# Patient Record
(demographics unavailable — no encounter records)

---

## 2018-06-15 NOTE — EMERGENCY DEPARTMENT REPORT
ED General Adult HPI





- General


Chief complaint: Alcohol


Stated complaint: ETOH


Time Seen by Provider: 06/15/18 19:53


Source: EMS


Mode of arrival: Stretcher


Limitations: No Limitations





- History of Present Illness


Initial comments: 





Patient presents to the emergency department for evaluation.  Patient states 

that she has been drinking and comes to the ED for further evaluation.  Patient 

has no other complaints.  Patient denies chest pain, shortness of breath, 

abdominal pain, headache


Radiation: non-radiation


Severity scale (0 -10): 0


Improves with: none


Worsens with: none


Associated Symptoms: denies other symptoms


Treatments Prior to Arrival: none





ED Review of Systems


ROS: 


Stated complaint: ETOH


Other details as noted in HPI





Comment: All other systems reviewed and negative


Constitutional: denies: chills, fever


Eyes: denies: eye pain, eye discharge, vision change


ENT: denies: ear pain, throat pain


Respiratory: denies: cough, shortness of breath, wheezing


Cardiovascular: denies: chest pain, palpitations


Endocrine: no symptoms reported


Gastrointestinal: denies: abdominal pain, nausea, diarrhea


Genitourinary: denies: urgency, dysuria, discharge


Musculoskeletal: denies: back pain, joint swelling, arthralgia


Skin: denies: rash, lesions


Neurological: denies: headache, weakness, paresthesias


Psychiatric: denies: anxiety, depression


Hematological/Lymphatic: denies: easy bleeding, easy bruising





ED Past Medical Hx





- Social History


Smoking Status: Current Every Day Smoker


Substance Use Type: Alcohol





ED Physical Exam





- General


Limitations: No Limitations, Other (patient appears inebriated with an odor of 

EtOH)


General appearance: alert, in no apparent distress





- Head


Head exam: Present: atraumatic, normocephalic





- Eye


Eye exam: Present: normal appearance





- ENT


ENT exam: Present: mucous membranes moist





- Neck


Neck exam: Present: normal inspection





- Respiratory


Respiratory exam: Present: normal lung sounds bilaterally.  Absent: respiratory 

distress





- Cardiovascular


Cardiovascular Exam: Present: regular rate, normal rhythm.  Absent: systolic 

murmur, diastolic murmur, rubs, gallop





- GI/Abdominal


GI/Abdominal exam: Present: soft, normal bowel sounds.  Absent: distended, 

tenderness





- Extremities Exam


Extremities exam: Present: normal inspection





- Back Exam


Back exam: Present: normal inspection





- Neurological Exam


Neurological exam: Present: alert, oriented X3, CN II-XII intact.  Absent: 

motor sensory deficit





- Psychiatric


Psychiatric exam: Present: normal affect, normal mood





- Skin


Skin exam: Present: warm, dry, intact, normal color.  Absent: rash





ED Course


 Vital Signs











  06/15/18 06/15/18





  19:50 20:00


 


Pulse Rate  79


 


Respiratory 19 20





Rate  


 


Blood Pressure  121/70


 


O2 Sat by Pulse 88 92





Oximetry  














ED Medical Decision Making





- Lab Data


Result diagrams: 


 06/15/18 20:23





 06/15/18 20:23





- Medical Decision Making





Discussed results with the patient


Critical care attestation.: 


If time is entered above; I have spent that time in minutes in the direct care 

of this critically ill patient, excluding procedure time.








ED Disposition


Clinical Impression: 


 Alcohol abuse





Disposition: DC-01 TO HOME OR SELFCARE


Is pt being admited?: No


Does the pt Need Aspirin: No


Condition: Stable


Instructions:  Alcohol Intoxication (ED), Abuse of Alcohol (ED)


Additional Instructions: 


Return if worse


Time of Disposition: 02:15

## 2018-06-20 NOTE — EMERGENCY DEPARTMENT REPORT
ED Shortness of Breath HPI





- General


Chief Complaint: Dyspnea/Respdistress


Stated Complaint: DIFFICULTY  BREATHING


Time Seen by Provider: 06/20/18 17:30


Source: EMS


Mode of arrival: Stretcher


Limitations: No Limitations





- History of Present Illness


Initial Comments: 





Patient states she is homeless and she's been smoking and drinking alcohol all 

day.  She came to the emergency room complaining of shortness of breath after 

drinking alcohol and smoking cigarettes today.  Patient looks unkempt.


MD Complaint: shortness of breath


-: Gradual


Severity: mild


Pain Scale: 3


Improves With: nothing


Worsens With: nothing


Treatments Prior to Arrival: none





- Related Data


 Home Medications











 Medication  Instructions  Recorded  Confirmed  Last Taken


 


Unobtainable  06/20/18 06/20/18 Unknown











 Allergies











Allergy/AdvReac Type Severity Reaction Status Date / Time


 


Penicillins Allergy Unknown Unknown Verified 06/20/18 17:21














ED Review of Systems


ROS: 


Stated complaint: DIFFICULTY  BREATHING


Other details as noted in HPI





Comment: All other systems reviewed and negative


Constitutional: denies: chills, fever


Eyes: denies: eye pain


ENT: denies: ear pain


Respiratory: shortness of breath


Cardiovascular: denies: chest pain, palpitations, dyspnea on exertion


Endocrine: no symptoms reported


Gastrointestinal: denies: abdominal pain, nausea, vomiting, diarrhea


Genitourinary: denies: urgency, dysuria, frequency


Musculoskeletal: denies: back pain, joint swelling


Skin: denies: rash, change in color


Neurological: denies: headache, weakness


Psychiatric: denies: anxiety, depression


Hematological/Lymphatic: denies: easy bleeding, easy bruising





ED Past Medical Hx





- Past Medical History


Previous Medical History?: Yes


Hx Hypertension: Yes


Hx Seizures: Yes


Hx COPD: Yes





- Surgical History


Past Surgical History?: Yes


Additional Surgical History: Partial Hysterectomy





- Social History


Smoking Status: Current Every Day Smoker


Substance Use Type: Alcohol





- Medications


Home Medications: 


 Home Medications











 Medication  Instructions  Recorded  Confirmed  Last Taken  Type


 


Unobtainable  06/20/18 06/20/18 Unknown History














ED Physical Exam





- General


Limitations: No Limitations


General appearance: alert, in no apparent distress, other (Unkempt)





- Head


Head exam: Present: atraumatic, normocephalic, normal inspection





- Eye


Eye exam: Present: normal appearance, PERRL, EOMI


Pupils: Present: normal accommodation





- ENT


ENT exam: Present: normal exam, normal orophraynx, mucous membranes moist





- Neck


Neck exam: Present: normal inspection, full ROM.  Absent: tenderness





- Respiratory


Respiratory exam: Present: normal lung sounds bilaterally.  Absent: respiratory 

distress, wheezes, rales, rhonchi





- Cardiovascular


Cardiovascular Exam: Present: regular rate, normal rhythm, normal heart sounds





- GI/Abdominal


GI/Abdominal exam: Present: soft, normal bowel sounds.  Absent: distended, 

tenderness, guarding, rebound





- Extremities Exam


Extremities exam: Present: normal inspection, full ROM, normal capillary refill





- Back Exam


Back exam: Present: normal inspection, full ROM





- Neurological Exam


Neurological exam: Present: alert, oriented X3, CN II-XII intact





- Psychiatric


Psychiatric exam: Present: normal affect, normal mood





- Skin


Skin exam: Present: warm, dry, intact, normal color





ED Course


 Vital Signs











  06/20/18 06/20/18 06/21/18





  17:13 19:28 10:41


 


Temperature 97.5 F L  97.8 F


 


Pulse Rate 112 H  92 H


 


Pulse Rate [  92 H 





Anterior   





Bilateral   





Throughout]   


 


Respiratory 18  18





Rate   


 


Respiratory  18 





Rate [Anterior   





Bilateral   





Throughout]   


 


Blood Pressure 140/80  178/90





[Left]   


 


O2 Sat by Pulse 100  95





Oximetry   














- Reevaluation(s)


Reevaluation #1: 





06/20/18 20:02


Patient care was transferred to Dr Siddiqi at shift change pending alcohol level, 

CTA of the chest with IV contrast to evaluate for pulmonary embolism.


Dr. Siddiqi to re-evaluate and disposition patient after her Lab and CT scan has 

resulted.





ED Medical Decision Making





- Lab Data


Result diagrams: 


 06/20/18 18:54





 06/20/18 18:54





- EKG Data


-: EKG Interpreted by Me


EKG shows normal: sinus rhythm


Rate: normal (99)





- EKG Data


When compared to previous EKG there are: previous EKG unavailable


Interpretation: no acute changes





- Radiology Data


Radiology results: report reviewed, image reviewed





- Medical Decision Making





Shortness of breath.


COPD.


Critical care attestation.: 


If time is entered above; I have spent that time in minutes in the direct care 

of this critically ill patient, excluding procedure time.








ED Disposition


Clinical Impression: 


 Alcohol abuse, Elevated d-dimer





Dyspnea


Qualifiers:


 Dyspnea type: unspecified Qualified Code(s): R06.00 - Dyspnea, unspecified





Disposition: DC-09 OP ADMIT IP TO THIS HOSP


Is pt being admited?: Yes


Does the pt Need Aspirin: No


Condition: Stable


Referrals: 


PRIMARY CARE,MD [Primary Care Provider] - 3-5 Days

## 2018-06-20 NOTE — CAT SCAN REPORT
FINAL REPORT



PROCEDURE:  CT ANGIO CHEST



TECHNIQUE:  Computerized tomographic angiography of the chest was

performed after the IV injection of iodinated nonionic contrast

including image processing. The image data was postprocessed

using 2-dimensional multiplanar reformatted (MPR) and

3-dimensional (MIP and/or volume rendered) techniques. 



HISTORY:  Elevated D-Dimer 



COMPARISON:  No prior studies are available for comparison.



FINDINGS:  

Bilateral pulmonary arteries and their branches demonstrate

normal opacification without filling defects. Aorta is of normal

caliber without evidence of dissection or aneurysm formation.

Cardiac size is within normal limits. There is mild degree

sub-carinal lymphadenopathy. Nonenlarged right hilar lymph nodes

are identified. Thyroid demonstrates normal size and density.

Visualized upper abdominal structures are within normal limits.

There is diffuse thickening of interstitial septa. There are no

confluent infiltrates or mass lesions. 



IMPRESSION:  

No evidence of pulmonary embolism



No acute pulmonary process



Diffuse interstitial septal thickening most likely represents

interstitial fibrosis. 



Mild degree sub-carinal lymphadenopathy.

## 2018-06-20 NOTE — XRAY REPORT
FINAL REPORT



EXAM:  XR CHEST 1V AP



HISTORY:  Shortness of breath 



TECHNIQUE:  AP portable view of the chest



PRIORS:  None.



FINDINGS:  

Lines, tubes, and devices:  N/A



Lungs and pleura: Trachea is normal in position. Lungs are clear

of infiltrate, pleural effusion, vascular congestion, or

pneumothorax. 



Cardiomediastinal silhouette: Cardiac and mediastinal silhouettes

are unremarkable. Calcification of the aortic arch is noted. 



Other: Bony structures are intact.



IMPRESSION:  

No acute cardiopulmonary process seen.

## 2018-06-21 NOTE — CONSULTATION
History of Present Illness





- Reason for Consult


Consult date: 06/21/18


Reason for consult: Mental Health Evaluation


Requesting physician: WADE CRUZ





- Chief Complaint


Chief complaint: 


"I want detox"





- History of Present Psychiatric Illness


63 y.o. white female presenting to the ER for shortness of breath and ETOH. 

Today the patient is calm and cooperative during the assessment. She stated  

drinking (etoh) more frequent since the death of both of her parents in 2008. 

She stated that she has been to rehab in the past, but would always relapse. 

She stated having a hx of depression and take Celexa. She stated that her main 

focus is to stop drinking (etoh) and not being homeless. She denies SI/HI's and 

AVH's. She denies any manic episodes and erratic sleep. She denies recreational 

drug use (etoh).  








Medications and Allergies


 Allergies











Allergy/AdvReac Type Severity Reaction Status Date / Time


 


Penicillins Allergy Unknown Unknown Verified 06/20/18 17:21











 Home Medications











 Medication  Instructions  Recorded  Confirmed  Last Taken  Type


 


Unobtainable  06/20/18 06/20/18 Unknown History











Active Meds: 


Active Medications





Enoxaparin Sodium (Lovenox)  40 mg SUB-Q QDAY ADORE


Famotidine (Pepcid)  20 mg PO BID ADORE


Folic Acid 1 mg/ Multivitamins /Minerals 10 ml/ Thiamine HCl 100 mg/ Sodium 

Chloride  1,000 mls @ 125 mls/hr IV .BY DURATION ADORE


Sodium Chloride (Nacl 0.9% 1000 Ml)  1,000 mls @ 125 mls/hr IV .BY DURATION ADORE


Lorazepam (Ativan)  2 mg IV Q1HR PRN


   PRN Reason: CIWA-Ar 8-15


   Last Admin: 06/21/18 07:55 Dose:  2 mg


Lorazepam (Ativan)  4 mg IV Q1HR PRN


   PRN Reason: CIWA-Ar 16-25


Lorazepam (Ativan)  4 mg IV Q15MIN PRN


   PRN Reason: CIWA-Ar >25











Past psychiatric history





- Past Medical History


Past Medical History: COPD, hypertension, seizures


Past Surgical History: No surgical history





- past Psychiatric treatment and history


Psych: Depression


psychiatric treatment history: 


Hx of depression. Denies a fam psy hx. 








- Social History


Social history: other (Homeless)





Mental Status Exam





- Vital signs


 Last Vital Signs











Temp  97.8 F   06/21/18 10:41


 


Pulse  92 H  06/21/18 10:41


 


Resp  18   06/21/18 11:53


 


BP  178/90   06/21/18 10:41


 


Pulse Ox  95   06/21/18 10:41














- Exam


Narrative exam: 


MSE:





 Appearance: calm, cooperative   


 Behavior: regular eye contact


 Speech: regular rate and tone


 Mood: withdrawn, sad


 Affect: congruent to mood   


 Thought Process: circumstantial           


 Thought Content: denies SI/HI's and AVH's


 Motor Activity: lying in bed


 Cognition: A/O x 3


 Insight: appropriate 


 Judgment: appropriate  











Results


Result Diagrams: 


 06/20/18 18:54





 06/20/18 18:54


 Abnormal lab results











  06/20/18 06/20/18 06/20/18 Range/Units





  18:54 18:54 18:54 


 


RBC   3.16 L   (3.65-5.03)  M/mm3


 


MCV   99 H   (79-97)  fl


 


MCH   33 H   (28-32)  pg


 


RDW   18.4 H   (13.2-15.2)  %


 


Mono % (Auto)   8.3 H   (0.0-7.3)  %


 


Eos % (Auto)   7.6 H   (0.0-4.3)  %


 


Eos #   0.5 H   (0.0-0.4)  K/mm3


 


D-Dimer     (0-234)  ng/mlDDU


 


BUN  6 L    (7-17)  mg/dL


 


Creatinine  0.6 L    (0.7-1.2)  mg/dL


 


Glucose  113 H    ()  mg/dL


 


AST    101 H  (5-40)  units/L


 


ALT    70 H  (7-56)  units/L


 


Urine WBC (Auto)     (0.0-6.0)  /HPF


 


Salicylates     (2.8-20.0)  mg/dL


 


Acetaminophen     (10.0-30.0)  ug/mL


 


Plasma/Serum Alcohol     (0-0.07)  %














  06/20/18 06/20/18 06/20/18 Range/Units





  18:54 18:54 18:54 


 


RBC     (3.65-5.03)  M/mm3


 


MCV     (79-97)  fl


 


MCH     (28-32)  pg


 


RDW     (13.2-15.2)  %


 


Mono % (Auto)     (0.0-7.3)  %


 


Eos % (Auto)     (0.0-4.3)  %


 


Eos #     (0.0-0.4)  K/mm3


 


D-Dimer     (0-234)  ng/mlDDU


 


BUN     (7-17)  mg/dL


 


Creatinine     (0.7-1.2)  mg/dL


 


Glucose     ()  mg/dL


 


AST     (5-40)  units/L


 


ALT     (7-56)  units/L


 


Urine WBC (Auto)     (0.0-6.0)  /HPF


 


Salicylates   < 0.3 L   (2.8-20.0)  mg/dL


 


Acetaminophen    < 5.0 L  (10.0-30.0)  ug/mL


 


Plasma/Serum Alcohol  0.22 H    (0-0.07)  %














  06/20/18 06/20/18 Range/Units





  19:15 Unknown 


 


RBC    (3.65-5.03)  M/mm3


 


MCV    (79-97)  fl


 


MCH    (28-32)  pg


 


RDW    (13.2-15.2)  %


 


Mono % (Auto)    (0.0-7.3)  %


 


Eos % (Auto)    (0.0-4.3)  %


 


Eos #    (0.0-0.4)  K/mm3


 


D-Dimer  1054.93 H   (0-234)  ng/mlDDU


 


BUN    (7-17)  mg/dL


 


Creatinine    (0.7-1.2)  mg/dL


 


Glucose    ()  mg/dL


 


AST    (5-40)  units/L


 


ALT    (7-56)  units/L


 


Urine WBC (Auto)   9.0 H  (0.0-6.0)  /HPF


 


Salicylates    (2.8-20.0)  mg/dL


 


Acetaminophen    (10.0-30.0)  ug/mL


 


Plasma/Serum Alcohol    (0-0.07)  %








All other labs normal.








Assessment and Plan


Assessment and plan: 


Impression: Hx of Depression. Alcohol Use DO. Alcohol Intoxication 0.22 on 

admission. Today the patient is calm and cooperative during the assessment. 





Recommendation/Plan: The patient was referred to [facility]. Discussed the 

importance to abstain from alcohol consumption (etoh) with patient. Discussed 

generalized coping skills with patient. Case Mgmt involvement, the patient is 

homeless.

## 2018-06-21 NOTE — EMERGENCY DEPARTMENT REPORT
Blank Doc





- Documentation


Documentation: 





I was asked by Dr Calderón to follow the CT angiography results for this patient 

and assist with disposition.  CT angiography does not show any signs of any 

pulmonary embolism or any other acute process.  The patient's blood alcohol 

level was 0.22 but upon recheck it is now down to 0.03.  The patient is awake 

and alert but does appear tremulous and is starting to have some withdrawal 

signs.  She has been placed on the CIWA protocol.  The patient will remain down 

here in the emergency department and will be seen by the [facility]  

for possible detox referral.

## 2018-06-21 NOTE — HISTORY AND PHYSICAL REPORT
History of Present Illness


Date of examination: 06/21/18


Date of admission: 


6/21/18





Chief complaint: 


Worsening shortness of breath/for alcohol detox


History of present illness: 


63-year-old  female patient with significant past medical history of 

COPD and chronic alcohol use 


presented to the emergency room with worsening shortness of breath and patient 

also requests to be admitted for detox program for alcohol.


Patient reports that she is homeless, and depressed and has been drinking 

excessively.


The [facility] team has evaluated the patient, and patient agreed to undergo 

treatment.


Patient denies chest pain or palpitations


Complains of mild shortness of breath








Past History


Past Medical History: other (depression)


Past Surgical History: No surgical history


Social history: smoking, alcohol abuse (on his alcohol use)


Family history: hypertension





Medications and Allergies


 Allergies











Allergy/AdvReac Type Severity Reaction Status Date / Time


 


Penicillins Allergy Unknown Unknown Verified 06/20/18 17:21











 Home Medications











 Medication  Instructions  Recorded  Confirmed  Last Taken  Type


 


Unobtainable  06/20/18 06/20/18 Unknown History











Active Meds: 


Active Medications





Lorazepam (Ativan)  2 mg IV Q1HR PRN


   PRN Reason: CIWA-Ar 8-15


   Last Admin: 06/21/18 07:55 Dose:  2 mg


Lorazepam (Ativan)  4 mg IV Q1HR PRN


   PRN Reason: CIWA-Ar 16-25


Lorazepam (Ativan)  4 mg IV Q15MIN PRN


   PRN Reason: CIWA-Ar >25











Review of Systems


Constitutional: no weight loss, no weight gain, no fever, no chills


Ears, nose, mouth and throat: no nasal congestion, no nasal discharge


Cardiovascular: shortness of breath, no chest pain, no orthopnea, no 

palpitations


Respiratory: no cough with sputum, no hemoptysis


Gastrointestinal: no abdominal pain, no nausea, no vomiting


Genitourinary Female: no flank pain, no dysuria


Musculoskeletal: no myalgias, no arthritis


Integumentary: no rash, no lesions


Neurological: no seizures, no syncope, no vertigo


Psychiatric: depression (no suicidal ideations or thoughts), no anxiety


Endocrine: no cold intolerance, no heat intolerance, no polydipsia, no polyuria


Hematologic/Lymphatic: no easy bruising, no easy bleeding


Allergic/Immunologic: no urticaria, no allergic rhinitis





Exam





- Constitutional


Vitals: 


 











Temp Pulse Resp BP Pulse Ox


 


 97.5 F L  92 H  18   140/80   100 


 


 06/20/18 17:13  06/20/18 19:28  06/20/18 19:28  06/20/18 17:13  06/20/18 17:13











General appearance: Present: no acute distress, well-nourished





- EENT


Eyes: Present: PERRL, EOM intact





- Neck


Neck: Present: supple, normal ROM





- Respiratory


Respiratory effort: normal


Respiratory: bilateral: diminished, wheezing, negative: rales, rhonchi





- Cardiovascular


Rhythm: regular


Heart Sounds: Present: S1 & S2





- Extremities


Extremities: no ischemia, No edema





- Abdominal


General gastrointestinal: Present: soft, non-tender, non-distended, normal 

bowel sounds





- Integumentary


Integumentary: Present: warm





- Musculoskeletal


Musculoskeletal: strength equal bilaterally, generalized weakness





- Psychiatric


Psychiatric: appropriate mood/affect, cooperative





- Neurologic


Neurologic: CNII-XII intact, moves all extremities





Results





- Labs


CBC & Chem 7: 


 06/20/18 18:54





 06/20/18 18:54


Labs: 


 Abnormal lab results











  06/20/18 06/20/18 06/20/18 Range/Units





  18:54 18:54 18:54 


 


RBC   3.16 L   (3.65-5.03)  M/mm3


 


MCV   99 H   (79-97)  fl


 


MCH   33 H   (28-32)  pg


 


RDW   18.4 H   (13.2-15.2)  %


 


Mono % (Auto)   8.3 H   (0.0-7.3)  %


 


Eos % (Auto)   7.6 H   (0.0-4.3)  %


 


Eos #   0.5 H   (0.0-0.4)  K/mm3


 


D-Dimer     (0-234)  ng/mlDDU


 


BUN  6 L    (7-17)  mg/dL


 


Creatinine  0.6 L    (0.7-1.2)  mg/dL


 


Glucose  113 H    ()  mg/dL


 


AST    101 H  (5-40)  units/L


 


ALT    70 H  (7-56)  units/L


 


Urine WBC (Auto)     (0.0-6.0)  /HPF


 


Salicylates     (2.8-20.0)  mg/dL


 


Acetaminophen     (10.0-30.0)  ug/mL


 


Plasma/Serum Alcohol     (0-0.07)  %














  06/20/18 06/20/18 06/20/18 Range/Units





  18:54 18:54 18:54 


 


RBC     (3.65-5.03)  M/mm3


 


MCV     (79-97)  fl


 


MCH     (28-32)  pg


 


RDW     (13.2-15.2)  %


 


Mono % (Auto)     (0.0-7.3)  %


 


Eos % (Auto)     (0.0-4.3)  %


 


Eos #     (0.0-0.4)  K/mm3


 


D-Dimer     (0-234)  ng/mlDDU


 


BUN     (7-17)  mg/dL


 


Creatinine     (0.7-1.2)  mg/dL


 


Glucose     ()  mg/dL


 


AST     (5-40)  units/L


 


ALT     (7-56)  units/L


 


Urine WBC (Auto)     (0.0-6.0)  /HPF


 


Salicylates   < 0.3 L   (2.8-20.0)  mg/dL


 


Acetaminophen    < 5.0 L  (10.0-30.0)  ug/mL


 


Plasma/Serum Alcohol  0.22 H    (0-0.07)  %














  06/20/18 06/20/18 Range/Units





  19:15 Unknown 


 


RBC    (3.65-5.03)  M/mm3


 


MCV    (79-97)  fl


 


MCH    (28-32)  pg


 


RDW    (13.2-15.2)  %


 


Mono % (Auto)    (0.0-7.3)  %


 


Eos % (Auto)    (0.0-4.3)  %


 


Eos #    (0.0-0.4)  K/mm3


 


D-Dimer  1054.93 H   (0-234)  ng/mlDDU


 


BUN    (7-17)  mg/dL


 


Creatinine    (0.7-1.2)  mg/dL


 


Glucose    ()  mg/dL


 


AST    (5-40)  units/L


 


ALT    (7-56)  units/L


 


Urine WBC (Auto)   9.0 H  (0.0-6.0)  /HPF


 


Salicylates    (2.8-20.0)  mg/dL


 


Acetaminophen    (10.0-30.0)  ug/mL


 


Plasma/Serum Alcohol    (0-0.07)  %














Assessment and Plan


--Alcohol detox program;


Continue current management per protocol


IV fluids, CIWA protocol





--Elevated D dimers; negative PE, negative DVT





--History of COPD; oxygen titrated O2 sats more than 90%


Nebulizers as needed, supportive care





--Ongoing tobacco use; smoking cessation counseling done


Advised nicotine patch





--Chronic alcohol use; strongly advised to quit alcohol intake





--Alcohol withdrawal symptoms; management per protocol


CIWA protocol, and detox program.





--DVT prophylaxis; Lovenox





Closely monitor the patient and adjust the management as needed

## 2018-06-22 NOTE — PROGRESS NOTE
Assessment and Plan


Assessment and plan: 


--Alcohol detox program;


Continue current management per protocol


IV fluids, CIWA protocol





--Elevated D dimers; negative PE, negative DVT





--History of COPD; oxygen titrated O2 sats more than 90%


Nebulizers as needed, supportive care





--Ongoing tobacco use; smoking cessation counseling done


Advised nicotine patch





--Chronic alcohol use; strongly advised to quit alcohol intake





--Alcohol withdrawal symptoms; management per protocol


CIWA protocol, and detox program.





--DVT prophylaxis; Lovenox





Closely monitor the patient and adjust the management as needed


Plan of care reviewed with the patient and the nurse











History


Interval history: 


Patient Seen and examined medical records reviewed


Complains of generalized body pains and it was for pain medications


Mild tremulousness no agitation


Alert awake oriented 3 not in acute distress





Hospitalist Physical





- Constitutional


Vitals: 


 











Temp Pulse Resp BP Pulse Ox


 


 97.4 F L  80   18   178/86   97 


 


 06/22/18 08:39  06/22/18 10:00  06/22/18 08:39  06/22/18 08:39  06/22/18 10:00











General appearance: Present: no acute distress, well-nourished





- EENT


Eyes: Present: PERRL, EOM intact





- Neck


Neck: Present: supple, normal ROM





- Respiratory


Respiratory effort: normal


Respiratory: bilateral: diminished, negative: rales, rhonchi, wheezing





- Cardiovascular


Rhythm: regular


Heart Sounds: Present: S1 & S2





- Extremities


Extremities: no ischemia, No edema





- Abdominal


General gastrointestinal: soft, non-tender, non-distended, normal bowel sounds





- Integumentary


Integumentary: Present: clear, warm





- Psychiatric


Psychiatric: appropriate mood/affect, cooperative





- Neurologic


Neurologic: CNII-XII intact, moves all extremities





Results





- Labs


CBC & Chem 7: 


 06/22/18 06:07





 06/22/18 06:07


Labs: 


 Laboratory Last Values











WBC  4.5 K/mm3 (4.5-11.0)   06/22/18  06:07    


 


RBC  3.21 M/mm3 (3.65-5.03)  L  06/22/18  06:07    


 


Hgb  10.6 gm/dl (10.1-14.3)   06/22/18  06:07    


 


Hct  31.9 % (30.3-42.9)   06/22/18  06:07    


 


MCV  99 fl (79-97)  H  06/22/18  06:07    


 


MCH  33 pg (28-32)  H  06/22/18  06:07    


 


MCHC  33 % (30-34)   06/22/18  06:07    


 


RDW  18.5 % (13.2-15.2)  H  06/22/18  06:07    


 


Plt Count  219 K/mm3 (140-440)   06/22/18  06:07    


 


Lymph % (Auto)  31.8 % (13.4-35.0)   06/22/18  06:07    


 


Mono % (Auto)  6.2 % (0.0-7.3)   06/22/18  06:07    


 


Eos % (Auto)  2.9 % (0.0-4.3)   06/22/18  06:07    


 


Baso % (Auto)  1.5 % (0.0-1.8)   06/22/18  06:07    


 


Lymph #  1.4 K/mm3 (1.2-5.4)   06/22/18  06:07    


 


Mono #  0.3 K/mm3 (0.0-0.8)   06/22/18  06:07    


 


Eos #  0.1 K/mm3 (0.0-0.4)   06/22/18  06:07    


 


Baso #  0.1 K/mm3 (0.0-0.1)   06/22/18  06:07    


 


Seg Neutrophils %  57.6 % (40.0-70.0)   06/22/18  06:07    


 


Seg Neutrophils #  2.6 K/mm3 (1.8-7.7)   06/22/18  06:07    


 


D-Dimer  1054.93 ng/mlDDU (0-234)  H  06/20/18  19:15    


 


Sodium  142 mmol/L (137-145)   06/22/18  06:07    


 


Potassium  4.1 mmol/L (3.6-5.0)   06/22/18  06:07    


 


Chloride  102.2 mmol/L ()   06/22/18  06:07    


 


Carbon Dioxide  26 mmol/L (22-30)   06/22/18  06:07    


 


Anion Gap  18 mmol/L  06/22/18  06:07    


 


BUN  9 mg/dL (7-17)   06/22/18  06:07    


 


Creatinine  0.5 mg/dL (0.7-1.2)  L  06/22/18  06:07    


 


Estimated GFR  > 60 ml/min  06/22/18  06:07    


 


BUN/Creatinine Ratio  18 %  06/22/18  06:07    


 


Glucose  92 mg/dL ()   06/22/18  06:07    


 


Calcium  8.9 mg/dL (8.4-10.2)   06/22/18  06:07    


 


Phosphorus  3.70 mg/dL (2.5-4.5)   06/22/18  06:07    


 


Magnesium  2.00 mg/dL (1.7-2.3)   06/22/18  06:07    


 


Total Bilirubin  0.50 mg/dL (0.1-1.2)   06/22/18  06:07    


 


Direct Bilirubin  < 0.2 mg/dL (0-0.2)   06/20/18  18:54    


 


Indirect Bilirubin  0.1 mg/dL  06/20/18  18:54    


 


AST  48 units/L (5-40)  H  06/22/18  06:07    


 


ALT  43 units/L (7-56)   06/22/18  06:07    


 


Alkaline Phosphatase  85 units/L ()   06/22/18  06:07    


 


NT-Pro-B Natriuret Pep  183.5 pg/mL (0-900)   06/20/18  18:54    


 


Total Protein  6.6 g/dL (6.3-8.2)   06/22/18  06:07    


 


Albumin  3.4 g/dL (3.9-5)  L  06/22/18  06:07    


 


Albumin/Globulin Ratio  1.1 %  06/22/18  06:07    


 


TSH  3.730 mlU/mL (0.270-4.200)   06/20/18  18:54    


 


Urine Color  Yellow  (Yellow)   06/20/18  Unknown


 


Urine Turbidity  Clear  (Clear)   06/20/18  Unknown


 


Urine pH  5.0  (5.0-7.0)   06/20/18  Unknown


 


Ur Specific Gravity  1.016  (1.003-1.030)   06/20/18  Unknown


 


Urine Protein  <15 mg/dl mg/dL (Negative)   06/20/18  Unknown


 


Urine Glucose (UA)  Neg mg/dL (Negative)   06/20/18  Unknown


 


Urine Ketones  Neg mg/dL (Negative)   06/20/18  Unknown


 


Urine Blood  Neg  (Negative)   06/20/18  Unknown


 


Urine Nitrite  Neg  (Negative)   06/20/18  Unknown


 


Urine Bilirubin  Neg  (Negative)   06/20/18  Unknown


 


Urine Urobilinogen  2.0 mg/dL (<2.0)   06/20/18  Unknown


 


Ur Leukocyte Esterase  Sm  (Negative)   06/20/18  Unknown


 


Urine WBC (Auto)  9.0 /HPF (0.0-6.0)  H  06/20/18  Unknown


 


Urine RBC (Auto)  7.0 /HPF (0.0-6.0)   06/20/18  Unknown


 


U Epithel Cells (Auto)  3.0 /HPF (0-13.0)   06/20/18  Unknown


 


Urine Bacteria (Auto)  1+ /HPF (Negative)   06/20/18  Unknown


 


Ur Transition Epith Cell  1 /HPF  06/20/18  Unknown


 


Hyaline Casts  1 /LPF  06/20/18  Unknown


 


Urine Mucus  Few /HPF  06/20/18  Unknown


 


Salicylates  < 0.3 mg/dL (2.8-20.0)  L  06/20/18  18:54    


 


Urine Opiates Screen  Presumptive negative   06/20/18  Unknown


 


Urine Methadone Screen  Presumptive negative   06/20/18  Unknown


 


Acetaminophen  < 5.0 ug/mL (10.0-30.0)  L  06/20/18  18:54    


 


Ur Barbiturates Screen  Presumptive negative   06/20/18  Unknown


 


Ur Phencyclidine Scrn  Presumptive negative   06/20/18  Unknown


 


Ur Amphetamines Screen  Presumptive negative   06/20/18  Unknown


 


U Benzodiazepines Scrn  Presumptive positive   06/20/18  Unknown


 


Urine Cocaine Screen  Presumptive negative   06/20/18  Unknown


 


U Marijuana (THC) Screen  Presumptive negative   06/20/18  Unknown


 


Drugs of Abuse Note  Disclamer   06/20/18  Unknown


 


Plasma/Serum Alcohol  0.03 % (0-0.07)   06/21/18  00:48

## 2018-06-23 NOTE — PROGRESS NOTE
Assessment and Plan


Assessment and plan: 


--Alcohol detox program; symptoms significantly improved


Continue current management per protocol, IV fluids, CIWA protocol





--Elevated D dimers; negative PE, negative DVT





--History of COPD; oxygen titrated O2 sats more than 90%


Nebulizers as needed, supportive care





--Ongoing tobacco use; smoking cessation counseling done


Advised nicotine patch





--Chronic alcohol use; strongly advised to quit alcohol intake





--Alcohol withdrawal symptoms; management per protocol


CIWA protocol, and detox program.





--DVT prophylaxis; Lovenox





Closely monitor the patient and adjust the management as needed


Plan of care reviewed with the patient and the nurse














History


Interval history: 


Patient seen and examined medical records reviewed


No new events reported by the nursing staff


Patient is sleeping easily awakens


No new complaints


Vital signs noted








Hospitalist Physical





- Constitutional


Vitals: 


 











Temp Pulse Resp BP Pulse Ox


 


 97.9 F   67   18   168/94   99 


 


 06/23/18 04:19  06/23/18 04:19  06/23/18 04:19  06/23/18 04:19  06/23/18 04:19











General appearance: Present: no acute distress, well-nourished





- EENT


Eyes: Present: PERRL, EOM intact





- Neck


Neck: Present: supple, normal ROM





- Respiratory


Respiratory effort: normal


Respiratory: bilateral: diminished, negative: rales, rhonchi, wheezing





- Cardiovascular


Rhythm: regular


Heart Sounds: Present: S1 & S2





- Extremities


Extremities: no ischemia, No edema





- Abdominal


General gastrointestinal: soft, non-tender, non-distended, normal bowel sounds





- Integumentary


Integumentary: Present: clear, warm





- Psychiatric


Psychiatric: appropriate mood/affect, cooperative





- Neurologic


Neurologic: CNII-XII intact, moves all extremities





Results





- Labs


CBC & Chem 7: 


 06/22/18 06:07





 06/22/18 06:07


Labs: 


 Laboratory Last Values











WBC  4.5 K/mm3 (4.5-11.0)   06/22/18  06:07    


 


RBC  3.21 M/mm3 (3.65-5.03)  L  06/22/18  06:07    


 


Hgb  10.6 gm/dl (10.1-14.3)   06/22/18  06:07    


 


Hct  31.9 % (30.3-42.9)   06/22/18  06:07    


 


MCV  99 fl (79-97)  H  06/22/18  06:07    


 


MCH  33 pg (28-32)  H  06/22/18  06:07    


 


MCHC  33 % (30-34)   06/22/18  06:07    


 


RDW  18.5 % (13.2-15.2)  H  06/22/18  06:07    


 


Plt Count  219 K/mm3 (140-440)   06/22/18  06:07    


 


Lymph % (Auto)  31.8 % (13.4-35.0)   06/22/18  06:07    


 


Mono % (Auto)  6.2 % (0.0-7.3)   06/22/18  06:07    


 


Eos % (Auto)  2.9 % (0.0-4.3)   06/22/18  06:07    


 


Baso % (Auto)  1.5 % (0.0-1.8)   06/22/18  06:07    


 


Lymph #  1.4 K/mm3 (1.2-5.4)   06/22/18  06:07    


 


Mono #  0.3 K/mm3 (0.0-0.8)   06/22/18  06:07    


 


Eos #  0.1 K/mm3 (0.0-0.4)   06/22/18  06:07    


 


Baso #  0.1 K/mm3 (0.0-0.1)   06/22/18  06:07    


 


Seg Neutrophils %  57.6 % (40.0-70.0)   06/22/18  06:07    


 


Seg Neutrophils #  2.6 K/mm3 (1.8-7.7)   06/22/18  06:07    


 


D-Dimer  1054.93 ng/mlDDU (0-234)  H  06/20/18  19:15    


 


Sodium  142 mmol/L (137-145)   06/22/18  06:07    


 


Potassium  4.1 mmol/L (3.6-5.0)   06/22/18  06:07    


 


Chloride  102.2 mmol/L ()   06/22/18  06:07    


 


Carbon Dioxide  26 mmol/L (22-30)   06/22/18  06:07    


 


Anion Gap  18 mmol/L  06/22/18  06:07    


 


BUN  9 mg/dL (7-17)   06/22/18  06:07    


 


Creatinine  0.5 mg/dL (0.7-1.2)  L  06/22/18  06:07    


 


Estimated GFR  > 60 ml/min  06/22/18  06:07    


 


BUN/Creatinine Ratio  18 %  06/22/18  06:07    


 


Glucose  92 mg/dL ()   06/22/18  06:07    


 


Calcium  8.9 mg/dL (8.4-10.2)   06/22/18  06:07    


 


Phosphorus  3.70 mg/dL (2.5-4.5)   06/22/18  06:07    


 


Magnesium  2.00 mg/dL (1.7-2.3)   06/22/18  06:07    


 


Total Bilirubin  0.50 mg/dL (0.1-1.2)   06/22/18  06:07    


 


Direct Bilirubin  < 0.2 mg/dL (0-0.2)   06/20/18  18:54    


 


Indirect Bilirubin  0.1 mg/dL  06/20/18  18:54    


 


AST  48 units/L (5-40)  H  06/22/18  06:07    


 


ALT  43 units/L (7-56)   06/22/18  06:07    


 


Alkaline Phosphatase  85 units/L ()   06/22/18  06:07    


 


NT-Pro-B Natriuret Pep  183.5 pg/mL (0-900)   06/20/18  18:54    


 


Total Protein  6.6 g/dL (6.3-8.2)   06/22/18  06:07    


 


Albumin  3.4 g/dL (3.9-5)  L  06/22/18  06:07    


 


Albumin/Globulin Ratio  1.1 %  06/22/18  06:07    


 


TSH  3.730 mlU/mL (0.270-4.200)   06/20/18  18:54    


 


Urine Color  Yellow  (Yellow)   06/20/18  Unknown


 


Urine Turbidity  Clear  (Clear)   06/20/18  Unknown


 


Urine pH  5.0  (5.0-7.0)   06/20/18  Unknown


 


Ur Specific Gravity  1.016  (1.003-1.030)   06/20/18  Unknown


 


Urine Protein  <15 mg/dl mg/dL (Negative)   06/20/18  Unknown


 


Urine Glucose (UA)  Neg mg/dL (Negative)   06/20/18  Unknown


 


Urine Ketones  Neg mg/dL (Negative)   06/20/18  Unknown


 


Urine Blood  Neg  (Negative)   06/20/18  Unknown


 


Urine Nitrite  Neg  (Negative)   06/20/18  Unknown


 


Urine Bilirubin  Neg  (Negative)   06/20/18  Unknown


 


Urine Urobilinogen  2.0 mg/dL (<2.0)   06/20/18  Unknown


 


Ur Leukocyte Esterase  Sm  (Negative)   06/20/18  Unknown


 


Urine WBC (Auto)  9.0 /HPF (0.0-6.0)  H  06/20/18  Unknown


 


Urine RBC (Auto)  7.0 /HPF (0.0-6.0)   06/20/18  Unknown


 


U Epithel Cells (Auto)  3.0 /HPF (0-13.0)   06/20/18  Unknown


 


Urine Bacteria (Auto)  1+ /HPF (Negative)   06/20/18  Unknown


 


Ur Transition Epith Cell  1 /HPF  06/20/18  Unknown


 


Hyaline Casts  1 /LPF  06/20/18  Unknown


 


Urine Mucus  Few /HPF  06/20/18  Unknown


 


Salicylates  < 0.3 mg/dL (2.8-20.0)  L  06/20/18  18:54    


 


Urine Opiates Screen  Presumptive negative   06/20/18  Unknown


 


Urine Methadone Screen  Presumptive negative   06/20/18  Unknown


 


Acetaminophen  < 5.0 ug/mL (10.0-30.0)  L  06/20/18  18:54    


 


Ur Barbiturates Screen  Presumptive negative   06/20/18  Unknown


 


Ur Phencyclidine Scrn  Presumptive negative   06/20/18  Unknown


 


Ur Amphetamines Screen  Presumptive negative   06/20/18  Unknown


 


U Benzodiazepines Scrn  Presumptive positive   06/20/18  Unknown


 


Urine Cocaine Screen  Presumptive negative   06/20/18  Unknown


 


U Marijuana (THC) Screen  Presumptive negative   06/20/18  Unknown


 


Drugs of Abuse Note  Disclamer   06/20/18  Unknown


 


Plasma/Serum Alcohol  0.03 % (0-0.07)   06/21/18  00:48

## 2018-06-24 NOTE — PROGRESS NOTE
Assessment and Plan


Assessment and plan: 


--Alcohol detox program; symptoms completely resolved 


No agitation or tremulousness , patient is calm and composed 


Ambulatory without problems , continue current management





--Elevated D dimers; negative PE, negative DVT





--History of COPD; oxygen titrated O2 sats more than 90%


Nebulizers as needed, supportive care





--Ongoing tobacco use; smoking cessation counseling done


Advised nicotine patch





--Chronic alcohol use; strongly advised to quit alcohol intake





--Alcohol withdrawal symptoms; management per protocol


  CIWA protocol, and detox program.





--DVT prophylaxis; Lovenox





New vision team following, possible discharge in 1-2 days if stable


Plan of care is reviewed with the patient and her nurse





History


Interval history: 


Patient seen and examined medical records reviewed


Feels better, no new complaints


No tremulousness or agitation


Vital signs reviewed








Hospitalist Physical





- Constitutional


Vitals: 


 











Temp Pulse Resp BP Pulse Ox


 


 95.5 F L  76   16   163/81   98 


 


 06/24/18 12:53  06/24/18 12:53  06/24/18 12:53  06/24/18 12:53  06/24/18 12:53











General appearance: Present: no acute distress, well-nourished





- EENT


Eyes: Present: PERRL, EOM intact





- Neck


Neck: Present: supple, normal ROM





- Respiratory


Respiratory effort: normal


Respiratory: bilateral: diminished, negative: rales, rhonchi, wheezing





- Cardiovascular


Rhythm: regular


Heart Sounds: Present: S1 & S2





- Extremities


Extremities: no ischemia, No edema





- Abdominal


General gastrointestinal: soft, non-tender, non-distended, normal bowel sounds





- Integumentary


Integumentary: Present: clear, warm





- Psychiatric


Psychiatric: appropriate mood/affect, cooperative





- Neurologic


Neurologic: CNII-XII intact, moves all extremities





Results





- Labs


CBC & Chem 7: 


 06/22/18 06:07





 06/22/18 06:07


Labs: 


 Laboratory Last Values











WBC  4.5 K/mm3 (4.5-11.0)   06/22/18  06:07    


 


RBC  3.21 M/mm3 (3.65-5.03)  L  06/22/18  06:07    


 


Hgb  10.6 gm/dl (10.1-14.3)   06/22/18  06:07    


 


Hct  31.9 % (30.3-42.9)   06/22/18  06:07    


 


MCV  99 fl (79-97)  H  06/22/18  06:07    


 


MCH  33 pg (28-32)  H  06/22/18  06:07    


 


MCHC  33 % (30-34)   06/22/18  06:07    


 


RDW  18.5 % (13.2-15.2)  H  06/22/18  06:07    


 


Plt Count  219 K/mm3 (140-440)   06/22/18  06:07    


 


Lymph % (Auto)  31.8 % (13.4-35.0)   06/22/18  06:07    


 


Mono % (Auto)  6.2 % (0.0-7.3)   06/22/18  06:07    


 


Eos % (Auto)  2.9 % (0.0-4.3)   06/22/18  06:07    


 


Baso % (Auto)  1.5 % (0.0-1.8)   06/22/18  06:07    


 


Lymph #  1.4 K/mm3 (1.2-5.4)   06/22/18  06:07    


 


Mono #  0.3 K/mm3 (0.0-0.8)   06/22/18  06:07    


 


Eos #  0.1 K/mm3 (0.0-0.4)   06/22/18  06:07    


 


Baso #  0.1 K/mm3 (0.0-0.1)   06/22/18  06:07    


 


Seg Neutrophils %  57.6 % (40.0-70.0)   06/22/18  06:07    


 


Seg Neutrophils #  2.6 K/mm3 (1.8-7.7)   06/22/18  06:07    


 


D-Dimer  1054.93 ng/mlDDU (0-234)  H  06/20/18  19:15    


 


Sodium  142 mmol/L (137-145)   06/22/18  06:07    


 


Potassium  4.1 mmol/L (3.6-5.0)   06/22/18  06:07    


 


Chloride  102.2 mmol/L ()   06/22/18  06:07    


 


Carbon Dioxide  26 mmol/L (22-30)   06/22/18  06:07    


 


Anion Gap  18 mmol/L  06/22/18  06:07    


 


BUN  9 mg/dL (7-17)   06/22/18  06:07    


 


Creatinine  0.5 mg/dL (0.7-1.2)  L  06/22/18  06:07    


 


Estimated GFR  > 60 ml/min  06/22/18  06:07    


 


BUN/Creatinine Ratio  18 %  06/22/18  06:07    


 


Glucose  92 mg/dL ()   06/22/18  06:07    


 


Calcium  8.9 mg/dL (8.4-10.2)   06/22/18  06:07    


 


Phosphorus  3.70 mg/dL (2.5-4.5)   06/22/18  06:07    


 


Magnesium  2.00 mg/dL (1.7-2.3)   06/22/18  06:07    


 


Total Bilirubin  0.50 mg/dL (0.1-1.2)   06/22/18  06:07    


 


Direct Bilirubin  < 0.2 mg/dL (0-0.2)   06/20/18  18:54    


 


Indirect Bilirubin  0.1 mg/dL  06/20/18  18:54    


 


AST  48 units/L (5-40)  H  06/22/18  06:07    


 


ALT  43 units/L (7-56)   06/22/18  06:07    


 


Alkaline Phosphatase  85 units/L ()   06/22/18  06:07    


 


NT-Pro-B Natriuret Pep  183.5 pg/mL (0-900)   06/20/18  18:54    


 


Total Protein  6.6 g/dL (6.3-8.2)   06/22/18  06:07    


 


Albumin  3.4 g/dL (3.9-5)  L  06/22/18  06:07    


 


Albumin/Globulin Ratio  1.1 %  06/22/18  06:07    


 


TSH  3.730 mlU/mL (0.270-4.200)   06/20/18  18:54    


 


Urine Color  Yellow  (Yellow)   06/20/18  Unknown


 


Urine Turbidity  Clear  (Clear)   06/20/18  Unknown


 


Urine pH  5.0  (5.0-7.0)   06/20/18  Unknown


 


Ur Specific Gravity  1.016  (1.003-1.030)   06/20/18  Unknown


 


Urine Protein  <15 mg/dl mg/dL (Negative)   06/20/18  Unknown


 


Urine Glucose (UA)  Neg mg/dL (Negative)   06/20/18  Unknown


 


Urine Ketones  Neg mg/dL (Negative)   06/20/18  Unknown


 


Urine Blood  Neg  (Negative)   06/20/18  Unknown


 


Urine Nitrite  Neg  (Negative)   06/20/18  Unknown


 


Urine Bilirubin  Neg  (Negative)   06/20/18  Unknown


 


Urine Urobilinogen  2.0 mg/dL (<2.0)   06/20/18  Unknown


 


Ur Leukocyte Esterase  Sm  (Negative)   06/20/18  Unknown


 


Urine WBC (Auto)  9.0 /HPF (0.0-6.0)  H  06/20/18  Unknown


 


Urine RBC (Auto)  7.0 /HPF (0.0-6.0)   06/20/18  Unknown


 


U Epithel Cells (Auto)  3.0 /HPF (0-13.0)   06/20/18  Unknown


 


Urine Bacteria (Auto)  1+ /HPF (Negative)   06/20/18  Unknown


 


Ur Transition Epith Cell  1 /HPF  06/20/18  Unknown


 


Hyaline Casts  1 /LPF  06/20/18  Unknown


 


Urine Mucus  Few /HPF  06/20/18  Unknown


 


Salicylates  < 0.3 mg/dL (2.8-20.0)  L  06/20/18  18:54    


 


Urine Opiates Screen  Presumptive negative   06/20/18  Unknown


 


Urine Methadone Screen  Presumptive negative   06/20/18  Unknown


 


Acetaminophen  < 5.0 ug/mL (10.0-30.0)  L  06/20/18  18:54    


 


Ur Barbiturates Screen  Presumptive negative   06/20/18  Unknown


 


Ur Phencyclidine Scrn  Presumptive negative   06/20/18  Unknown


 


Ur Amphetamines Screen  Presumptive negative   06/20/18  Unknown


 


U Benzodiazepines Scrn  Presumptive positive   06/20/18  Unknown


 


Urine Cocaine Screen  Presumptive negative   06/20/18  Unknown


 


U Marijuana (THC) Screen  Presumptive negative   06/20/18  Unknown


 


Drugs of Abuse Note  Disclamer   06/20/18  Unknown


 


Plasma/Serum Alcohol  0.03 % (0-0.07)   06/21/18  00:48

## 2018-06-25 NOTE — DISCHARGE SUMMARY
Providers





- Providers


Date of Admission: 


06/21/18 10:07





Date of discharge: 06/25/18


Attending physician: 


AMY J KOCHERLA





Primary care physician: 


PRIMARY CARE MD








Hospitalization


Reason for admission: worsening shortness of breath/requests alcohol 

detoxification


Condition: Stable


Pertinent studies: 


Chest x-ray; no acute abnormality noted





CTA chest; negative for PE, no acute pulmonary process diffuse interstitial 

septal thickening 


most likely represents interstitial fibrosis, mild degree subcarinal 

lymphadenopathy





Lower extremity venous Doppler; negative for DVT





Hospital course: 


Very pleasant 63-year-old  female patient with significant past 

medical history of COPD chronic alcohol use


Presented to the emergency room with worsening shortness of breath as well as 

for alcohol detoxification


Initial evaluation and admitted to the hospital under medical stabilization unit


Evaluated by nutrition team, and treatment was started per protocol


Patient was closely monitored, medications were optimized


CTA chest negative for PE, lower extremity venous Doppler negative for DVT


Patient was also placed on alcohol withdrawal symptoms


Patient was counseled and advised to quit alcohol and tobacco use


Patient's symptoms significantly improved


Today he is comfortable in bed no new complaints, Vital signs stable


Face-to-face evaluation and physical examination done by me prior to discharge 

unremarkable


Patient is hemodynamically and clinically stable for discharge and transfer to 

[facility].


Patient was stable at the time of discharge and transfer





Discharge diagnosis;


--Alcohol detox program; per protocol


--Elevated D dimers; negative PE, negative DVT


--History of COPD; oxygen titrated O2 sats more than 90%


--Interstitial lung disease


--Ongoing tobacco use; smoking cessation


--Chronic alcohol use; advised to quit 


--Alcohol withdrawal symptoms; CIWA/alcohol detox 


 


Disposition: DC/TX-[facility] PS[facility] HOSP/PSY UNIT


Time spent for discharge: 33 min





Core Measure Documentation





- Palliative Care


Palliative Care/ Comfort Measures: Not Applicable





- Core Measures


Any of the following diagnoses?: none





Exam





- Constitutional


Vitals: 


 











Temp Pulse Resp BP Pulse Ox


 


 97.8 F   78   18   156/73   97 


 


 06/25/18 08:35  06/25/18 08:35  06/25/18 08:35  06/25/18 08:35  06/25/18 08:35











General appearance: Present: no acute distress, well-nourished





- EENT


Eyes: Present: PERRL, EOM intact





- Neck


Neck: Present: supple, normal ROM





- Respiratory


Respiratory effort: normal


Respiratory: bilateral: diminished, negative: rales, rhonchi, wheezing





- Cardiovascular


Rhythm: regular


Heart Sounds: Present: S1 & S2





- Extremities


Extremities: no ischemia, No edema





- Abdominal


General gastrointestinal: Present: soft, non-tender, non-distended, normal 

bowel sounds





- Integumentary


Integumentary: Present: clear, warm





- Musculoskeletal


Musculoskeletal: strength equal bilaterally





- Psychiatric


Psychiatric: appropriate mood/affect, cooperative





- Neurologic


Neurologic: CNII-XII intact, moves all extremities





Plan


Activity: advance as tolerated


Diet: regular


Additional Instructions: D/C and transfer to [facility]  today counseling.

  counseling, advised to quit alcohol intake


Follow up with: 


PRIMARY CARE,MD [Primary Care Provider] - 3-5 Days

## 2018-06-27 NOTE — VASCULAR LAB REPORT
LOWER EXTREMITY VENOUS DUPLEX:



REASON FOR EXAM: Elevated d-dimer.



COMMENTS ON THE RIGHT:

All veins visualized are freely compressible without evidence of

internal echogenicity.  Flow is spontaneous and phasic throughout.



COMMENTS ON THE LEFT:

All veins visualized are freely compressible without evidence of

internal echogenicity.  Flow is spontaneous and phasic throughout.



IMPRESSION:

No evidence of acute or chronic deep venous thrombosis in either

lower extremity.
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
## 2018-06-28 NOTE — XRAY REPORT
FINAL REPORT



PROCEDURE:  Chest. 



TECHNIQUE:  Portable AP view. 



HISTORY:  Asthma. 



COMPARISON:  Chest 06/20/2018.



FINDINGS:  

The heart size is normal. There is mild tortuosity of the

thoracic aorta. The lungs are clear and well expanded. There are

no pleural effusions. The soft tissues and regional skeleton are

unremarkable. 



IMPRESSION:  

Negative portable chest.